# Patient Record
(demographics unavailable — no encounter records)

---

## 2025-01-30 NOTE — PHYSICAL EXAM
[General Appearance - Alert] : alert [General Appearance - In No Acute Distress] : in no acute distress [Sclera] : the sclera and conjunctiva were normal [Outer Ear] : the ears and nose were normal in appearance [Hearing Threshold Finger Rub Not [place]] : hearing was normal [Neck Appearance] : the appearance of the neck was normal [] : no respiratory distress [Exaggerated Use Of Accessory Muscles For Inspiration] : no accessory muscle use [Apical Impulse] : the apical impulse was normal [Heart Sounds] : normal S1 and S2 [Bowel Sounds] : normal bowel sounds [Abdomen Soft] : soft [Cranial Nerves] : cranial nerves 2-12 were intact [No Focal Deficits] : no focal deficits [Oriented To Time, Place, And Person] : oriented to person, place, and time [Affect] : the affect was normal [FreeTextEntry1] : le edema

## 2025-01-30 NOTE — HISTORY OF PRESENT ILLNESS
[FreeTextEntry1] : Pleasant 56 yo male with hx of borderline DM ( noted on labs in 2020 - A1c 6.2) who was referred for eval of CKD and nephrolithaisis - was hospitalized in 9/2024, underwent cysto with stenting ( calcium oxalate stone) - no recent labs) - reports heavy etoh - quit severl years ago - has lost 15 lbs  ALL: NKDA Hab no eto or smoking

## 2025-01-30 NOTE — PHYSICAL EXAM
[General Appearance - Alert] : alert [General Appearance - In No Acute Distress] : in no acute distress [Sclera] : the sclera and conjunctiva were normal [Outer Ear] : the ears and nose were normal in appearance [Hearing Threshold Finger Rub Not [place]] : hearing was normal [Neck Appearance] : the appearance of the neck was normal [] : no respiratory distress [Exaggerated Use Of Accessory Muscles For Inspiration] : no accessory muscle use [Apical Impulse] : the apical impulse was normal [Heart Sounds] : normal S1 and S2 [Bowel Sounds] : normal bowel sounds [Abdomen Soft] : soft [Cranial Nerves] : cranial nerves 2-12 were intact [No Focal Deficits] : no focal deficits [Oriented To Time, Place, And Person] : oriented to person, place, and time [Affect] : the affect was normal [FreeTextEntry1] : le edema

## 2025-01-30 NOTE — ASSESSMENT
[FreeTextEntry1] : Nephrolithiasis - ca ox - check 24 hr urine collection -  on increasing fludi intake - 2liters - start stone stopper  Borderline DM -  on diet, exercise - check A1c  CKD 2 - GFR 62 in 2020, no recent labs - check UPC, cystatin C  WAYNE - refer for sleep study -refer to pulm  f/u in 3 months, sooner dep on labs >55 min spent discussing kidney stones, casues, risk factors, management inclduing dietary, hydration, weightloss. Also discussed seep apnea, obesity, and DM - management of whcih will reduce likliood of developing cardiovascular disease, ckd and stroke

## 2025-01-30 NOTE — HISTORY OF PRESENT ILLNESS
[FreeTextEntry1] : Pleasant 54 yo male with hx of borderline DM ( noted on labs in 2020 - A1c 6.2) who was referred for eval of CKD and nephrolithaisis - was hospitalized in 9/2024, underwent cysto with stenting ( calcium oxalate stone) - no recent labs) - reports heavy etoh - quit severl years ago - has lost 15 lbs  ALL: NKDA Hab no eto or smoking